# Patient Record
Sex: MALE | Race: WHITE | NOT HISPANIC OR LATINO | Employment: UNEMPLOYED | URBAN - METROPOLITAN AREA
[De-identification: names, ages, dates, MRNs, and addresses within clinical notes are randomized per-mention and may not be internally consistent; named-entity substitution may affect disease eponyms.]

---
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
## 2024-08-31 ENCOUNTER — OFFICE VISIT (OUTPATIENT)
Dept: URGENT CARE | Facility: URGENT CARE | Age: 19
End: 2024-08-31

## 2024-08-31 VITALS
OXYGEN SATURATION: 100 % | TEMPERATURE: 98.1 F | DIASTOLIC BLOOD PRESSURE: 79 MMHG | RESPIRATION RATE: 18 BRPM | HEART RATE: 84 BPM | WEIGHT: 137 LBS | SYSTOLIC BLOOD PRESSURE: 144 MMHG

## 2024-08-31 DIAGNOSIS — N36.8 URETHRAL MEATUS PAIN: Primary | ICD-10-CM

## 2024-08-31 PROCEDURE — 99203 OFFICE O/P NEW LOW 30 MIN: CPT | Performed by: FAMILY MEDICINE

## 2024-08-31 ASSESSMENT — PAIN SCALES - GENERAL: PAINLEVEL: NO PAIN (0)

## 2024-08-31 NOTE — PROGRESS NOTES
ICD-10-CM    1. Urethral meatus pain  N36.8         No evidence of fungal, viral, or bacterial infection at this time.  Superficial tear healing well.    PLAN:  Patient Instructions   Lidocaine ointment or cream:  apply small amount to painful area per package instructions as needed.    Keep an eye out for lots of redness, any discharge (especially pus), or any swelling, and follow up if any of these develop.    SUBJECTIVE:  Sukhi Marquez is a 19 year old male who presents to UC today with small tear hear the urethral meatus sustained during sexual intercourse on Tuesday.  Hasn't seen any discharge or drainage.  Feeling otherwise OK.  Area is still painful and he wants to make sure things are healing up OK.    OBJECTIVE:  BP (!) 144/79 (BP Location: Right arm, Patient Position: Sitting, Cuff Size: Adult Regular)   Pulse 84   Temp 98.1  F (36.7  C) (Oral)   Resp 18   Wt 62.1 kg (137 lb)   SpO2 100%   GEN: well-appearing, in NAD  : healing superficial very tiny tear at about 6 o'clock position of urethral meatus.  There is no erythema on the tip of the penis.  There is no urethral discharge.  There are no vesicular or other skin lesions on the penis.  Testes normal and nontender bilaterally.

## 2024-08-31 NOTE — PATIENT INSTRUCTIONS
Lidocaine ointment or cream:  apply small amount to painful area per package instructions as needed.    Keep an eye out for lots of redness, any discharge (especially pus), or any swelling, and follow up if any of these develop.